# Patient Record
Sex: FEMALE | Race: OTHER | HISPANIC OR LATINO | URBAN - METROPOLITAN AREA
[De-identification: names, ages, dates, MRNs, and addresses within clinical notes are randomized per-mention and may not be internally consistent; named-entity substitution may affect disease eponyms.]

---

## 2022-11-04 NOTE — ASU PATIENT PROFILE, ADULT - PAIN CHRONIC, PROFILE
no Implemented All Universal Safety Interventions:  [place] to call system. Call bell, personal items and telephone within reach. Instruct patient to call for assistance. Room bathroom lighting operational. Non-slip footwear when patient is off stretcher. Physically safe environment: no spills, clutter or unnecessary equipment. Stretcher in lowest position, wheels locked, appropriate side rails in place.

## 2022-11-04 NOTE — ASU PATIENT PROFILE, ADULT - NS PREOP UNDERSTANDS INFO
Nothing to eat or drink after midnight. Patient to bring photo ID/insurance card; dress in comfortable clothes; no jewelries/valuables/contact lens; escort must be 18yrs old and must have a photo ID. No smoking/alcohol/drug use Sunday. Address and call back number given./yes

## 2022-11-04 NOTE — ASU PATIENT PROFILE, ADULT - PAIN SCALE PREFERRED, PROFILE
Detail Level: Detailed Add 45530 Cpt? (Important Note: In 2017 The Use Of 53437 Is Being Tracked By Cms To Determine Future Global Period Reimbursement For Global Periods): yes Wound Evaluated By: Zeferino Selby numerical 0-10

## 2022-11-07 ENCOUNTER — OUTPATIENT (OUTPATIENT)
Dept: OUTPATIENT SERVICES | Facility: HOSPITAL | Age: 42
LOS: 1 days | Discharge: HOME | End: 2022-11-07
Payer: MEDICAID

## 2022-11-07 VITALS
OXYGEN SATURATION: 97 % | TEMPERATURE: 98 F | HEART RATE: 64 BPM | WEIGHT: 193.35 LBS | SYSTOLIC BLOOD PRESSURE: 116 MMHG | HEIGHT: 70 IN | DIASTOLIC BLOOD PRESSURE: 80 MMHG | RESPIRATION RATE: 14 BRPM

## 2022-11-07 VITALS
TEMPERATURE: 98 F | RESPIRATION RATE: 16 BRPM | DIASTOLIC BLOOD PRESSURE: 85 MMHG | SYSTOLIC BLOOD PRESSURE: 125 MMHG | OXYGEN SATURATION: 97 % | HEART RATE: 66 BPM

## 2022-11-07 DIAGNOSIS — Z98.890 OTHER SPECIFIED POSTPROCEDURAL STATES: Chronic | ICD-10-CM

## 2022-11-07 DIAGNOSIS — Z90.710 ACQUIRED ABSENCE OF BOTH CERVIX AND UTERUS: Chronic | ICD-10-CM

## 2022-11-07 DIAGNOSIS — Z98.891 HISTORY OF UTERINE SCAR FROM PREVIOUS SURGERY: Chronic | ICD-10-CM

## 2022-11-07 LAB — SARS-COV-2 RNA SPEC QL NAA+PROBE: NEGATIVE — SIGNIFICANT CHANGE UP

## 2022-11-07 PROCEDURE — 88305 TISSUE EXAM BY PATHOLOGIST: CPT | Mod: 26

## 2022-11-07 PROCEDURE — 19318 BREAST REDUCTION: CPT | Mod: AS,50

## 2022-11-07 RX ORDER — AMLODIPINE BESYLATE 2.5 MG/1
1 TABLET ORAL
Qty: 0 | Refills: 0 | DISCHARGE

## 2022-11-07 RX ORDER — GABAPENTIN 400 MG/1
300 CAPSULE ORAL ONCE
Refills: 0 | Status: DISCONTINUED | OUTPATIENT
Start: 2022-11-07 | End: 2022-11-07

## 2022-11-07 RX ORDER — KETOROLAC TROMETHAMINE 30 MG/ML
30 SYRINGE (ML) INJECTION ONCE
Refills: 0 | Status: DISCONTINUED | OUTPATIENT
Start: 2022-11-07 | End: 2022-11-07

## 2022-11-07 RX ORDER — APREPITANT 80 MG/1
40 CAPSULE ORAL ONCE
Refills: 0 | Status: COMPLETED | OUTPATIENT
Start: 2022-11-07 | End: 2022-11-07

## 2022-11-07 RX ORDER — FENTANYL CITRATE 50 UG/ML
25 INJECTION INTRAVENOUS
Refills: 0 | Status: DISCONTINUED | OUTPATIENT
Start: 2022-11-07 | End: 2022-11-07

## 2022-11-07 RX ORDER — SODIUM CHLORIDE 9 MG/ML
500 INJECTION, SOLUTION INTRAVENOUS
Refills: 0 | Status: COMPLETED | OUTPATIENT
Start: 2022-11-07 | End: 2022-11-07

## 2022-11-07 RX ORDER — ACETAMINOPHEN 500 MG
1000 TABLET ORAL ONCE
Refills: 0 | Status: COMPLETED | OUTPATIENT
Start: 2022-11-07 | End: 2022-11-07

## 2022-11-07 RX ORDER — ONDANSETRON 8 MG/1
4 TABLET, FILM COATED ORAL ONCE
Refills: 0 | Status: COMPLETED | OUTPATIENT
Start: 2022-11-07 | End: 2022-11-07

## 2022-11-07 RX ORDER — SODIUM CHLORIDE 9 MG/ML
1000 INJECTION, SOLUTION INTRAVENOUS
Refills: 0 | Status: DISCONTINUED | OUTPATIENT
Start: 2022-11-07 | End: 2022-11-07

## 2022-11-07 RX ADMIN — APREPITANT 40 MILLIGRAM(S): 80 CAPSULE ORAL at 09:47

## 2022-11-07 RX ADMIN — SODIUM CHLORIDE 999 MILLILITER(S): 9 INJECTION, SOLUTION INTRAVENOUS at 13:50

## 2022-11-07 RX ADMIN — Medication 1000 MILLIGRAM(S): at 09:46

## 2022-11-07 RX ADMIN — ONDANSETRON 4 MILLIGRAM(S): 8 TABLET, FILM COATED ORAL at 14:49

## 2022-11-07 NOTE — ASU DISCHARGE PLAN (ADULT/PEDIATRIC) - NS MD DC FALL RISK RISK
For information on Fall & Injury Prevention, visit: [email or web address] OR  [email or web address] OR  https://www.cdc.gov/steadi/patient.html

## 2022-11-07 NOTE — PRE-ANESTHESIA EVALUATION ADULT - NSANTHOSAYNRD_GEN_A_CORE
No. [place] screening performed.  STOP BANG Legend: 0-2 = LOW Risk; 3-4 = INTERMEDIATE Risk; 5-8 = HIGH Risk

## 2022-11-22 LAB — SURGICAL PATHOLOGY STUDY: SIGNIFICANT CHANGE UP

## (undated) DEVICE — SUT QUILL MONODERM 2-0 30CM 24MM

## (undated) DEVICE — GOWN ROYAL SILK XL

## (undated) DEVICE — TUBING SUCTION NONCONDUCTIVE 6MM X 12FT

## (undated) DEVICE — ELCTR STRYKER NEPTUNE BLADE COATED, INSULATED 70MM

## (undated) DEVICE — SUT VICRYL 2-0 27" CT-1 UNDYED

## (undated) DEVICE — DRSG GAUZE MOISTURIZER 0.5 OZ 4X8

## (undated) DEVICE — SYR LUER LOK 50CC

## (undated) DEVICE — NDL SPINAL 18G X 3.5" (PINK)

## (undated) DEVICE — SUT MONOCRYL 3-0 18" PS-1

## (undated) DEVICE — DRSG COMBINE 5X9"

## (undated) DEVICE — SLV COMPRESSION KNEE MED

## (undated) DEVICE — PACK BREAST RECONSTRUCTION

## (undated) DEVICE — WARMING BLANKET LOWER ADULT

## (undated) DEVICE — GLV 7.5 PROTEXIS (WHITE)

## (undated) DEVICE — WARMING BLANKET FULL UNDERBODY ADULT

## (undated) DEVICE — ELCTR PENCIL SMOKE EVACUATOR COATED PUSH BUTTON 70MM

## (undated) DEVICE — SUT MONOCRYL 4-0 27" PS-2 UNDYED